# Patient Record
Sex: MALE | Race: WHITE | NOT HISPANIC OR LATINO | Employment: FULL TIME | URBAN - METROPOLITAN AREA
[De-identification: names, ages, dates, MRNs, and addresses within clinical notes are randomized per-mention and may not be internally consistent; named-entity substitution may affect disease eponyms.]

---

## 2024-05-23 DIAGNOSIS — I10 ESSENTIAL HYPERTENSION: ICD-10-CM

## 2024-05-23 DIAGNOSIS — K21.9 GASTROESOPHAGEAL REFLUX DISEASE, UNSPECIFIED WHETHER ESOPHAGITIS PRESENT: ICD-10-CM

## 2024-05-23 RX ORDER — LISINOPRIL 40 MG/1
40 TABLET ORAL DAILY
Qty: 90 TABLET | Refills: 0 | Status: SHIPPED | OUTPATIENT
Start: 2024-05-23

## 2024-05-23 RX ORDER — OMEPRAZOLE 40 MG/1
40 CAPSULE, DELAYED RELEASE ORAL
Qty: 90 CAPSULE | Refills: 2 | Status: SHIPPED | OUTPATIENT
Start: 2024-05-23

## 2024-08-07 DIAGNOSIS — I10 ESSENTIAL HYPERTENSION: ICD-10-CM

## 2024-08-08 RX ORDER — LISINOPRIL 40 MG/1
40 TABLET ORAL DAILY
Qty: 90 TABLET | Refills: 3 | Status: SHIPPED | OUTPATIENT
Start: 2024-08-08

## 2025-02-03 DIAGNOSIS — K21.9 GASTROESOPHAGEAL REFLUX DISEASE, UNSPECIFIED WHETHER ESOPHAGITIS PRESENT: ICD-10-CM

## 2025-02-03 NOTE — TELEPHONE ENCOUNTER
LV 4/19/23 no showed 7/2023, no future appts.     Only phone number active in chart is spouse Kiana cell. Left message trying to reach  pt, must make an appointment for med refills.    Dr Hodge declined refills 5/23/24

## 2025-02-05 RX ORDER — OMEPRAZOLE 40 MG/1
40 CAPSULE, DELAYED RELEASE ORAL
Qty: 90 CAPSULE | Refills: 3 | OUTPATIENT
Start: 2025-02-05

## 2025-04-02 PROBLEM — Z09 HOSPITAL DISCHARGE FOLLOW-UP: Status: ACTIVE | Noted: 2025-04-02

## 2025-04-02 PROBLEM — I10 PRIMARY HYPERTENSION: Status: ACTIVE | Noted: 2025-04-02

## 2025-04-02 PROBLEM — I63.9 CEREBROVASCULAR ACCIDENT (CVA) (MULTI): Status: ACTIVE | Noted: 2025-04-02

## 2025-04-02 PROBLEM — Z76.89 ENCOUNTER TO ESTABLISH CARE WITH NEW PROVIDER: Status: ACTIVE | Noted: 2025-04-02

## 2025-04-02 NOTE — ASSESSMENT & PLAN NOTE
MRA and CT of head unremarkable  CT of head unremarkable  No residual  Followed up with neurology on 3/31/2025  Continue plavix 75m gg daily

## 2025-04-02 NOTE — ASSESSMENT & PLAN NOTE
Was placed on  by his nifedipine 30mg by neurology  KIKO Cardoso, CNP  Continue metoprolol 25mg daily  Continue coreg 6.25mg daily

## 2025-04-02 NOTE — PROGRESS NOTES
[facility]: [facility]  PROGRESS NOTE      Rush Louis is a 59 y.o. male that is presenting today to establish care with new provider and for hospital discharge follow up.  The patient presents to the [facility] ER on 3/20/2025 with numbness of his right face. He feels like he has Novocain in his face. He states he noticed it 2 days ago. He also felt like his brain was foggy. He also states he had a difficult writing a few days ago. No loss of vision. No unilateral weakness or paresthesias. He does feel like his gait was a little off. Patient has not seen a doctor in a while when I asked if he has had his blood pressure checked. Patient admits to drinking a couple beers a day but does not feel he is dependent on it. No injuries or falls. No fevers or chills. Patient is not on any blood thinners.   He was discharged on 3/23/2025 and advised to follow up with pcp and [facility] stroke clinic.  Stroke symptoms and hypertensive urgency resolved while in the hospital.  He is a former patient of Dr. Hodge - last saw him about 1 year ago.  He is accompanied by his sister today.  He states he is doing well.,  Has no residual from the stroke.  His lisinopril was stopped by neurology and he was started on nifedipine.  He does report some right foot pain and a bump on the top of his foot.  States he kicked a heating vent when he woke up during the night 2 years ago.  Sometimes has difficulty walking    .    Assessment/Plan   Assessment & Plan  Encounter to establish care with new provider  Medications and problem list reviewed today       Hospital discharge follow-up  Hospital course reviewed with the patient as well as test results       Cerebrovascular accident (CVA), unspecified mechanism (Multi)  MRA and CT of head unremarkable  CT of head unremarkable  No residual  Followed up with neurology on 3/31/2025  Continue plavix 75m gg daily       Primary hypertension  Was placed on  by his nifedipine 30mg  by neurology  Destini Neves, APRN, CNP  Continue metoprolol 25mg daily  Continue coreg 6.25mg daily       Gastroesophageal reflux disease without esophagitis  Stable no recent flares  Continue omeprazole 40mg daily       Hyperlipidemia, unspecified hyperlipidemia type  Continue atorvastatin 40mg daily  Orders:    Lipid Panel; Future    Gastroesophageal reflux disease, unspecified whether esophagitis present    Orders:    omeprazole (PriLOSEC) 40 mg DR capsule; Take 1 capsule (40 mg) by mouth once daily in the morning. Take before meals.    Smoker  Smokes < 1/2 ppd  Slowly decreasing    Foot pain, right    Orders:    XR foot right 1-2 views; Future      Subjective   HPI  Review of Systems   Constitutional: Negative.    Respiratory: Negative.     Cardiovascular: Negative.    Gastrointestinal: Negative.    Skin: Negative.    Psychiatric/Behavioral: Negative.        Objective   There were no vitals filed for this visit.   There is no height or weight on file to calculate BMI.  Physical Exam  Cardiovascular:      Rate and Rhythm: Normal rate and regular rhythm.      Heart sounds: Normal heart sounds.   Pulmonary:      Effort: Pulmonary effort is normal.      Breath sounds: Normal breath sounds.   Abdominal:      General: Bowel sounds are normal.      Palpations: Abdomen is soft.   Musculoskeletal:      Comments: See hpi   Skin:     General: Skin is warm and dry.   Neurological:      General: No focal deficit present.      Mental Status: He is oriented to person, place, and time.   Psychiatric:         Mood and Affect: Mood normal.         Thought Content: Thought content normal.         Judgment: Judgment normal.       Diagnostic Results   Lab Results   Component Value Date    GLUCOSE 112 (H) 04/19/2023    CALCIUM 9.1 04/19/2023     04/19/2023    K 4.0 04/19/2023    CO2 23 (L) 04/19/2023     04/19/2023    BUN 5 (L) 04/19/2023    CREATININE 0.8 04/19/2023     Lab Results   Component Value Date    ALT 21  "04/19/2023    AST 27 04/19/2023    ALKPHOS 94 04/19/2023    BILITOT 0.5 04/19/2023     Lab Results   Component Value Date    WBC 9.2 04/19/2023    HGB 14.0 04/19/2023    HCT 41.1 04/19/2023    MCV 98.8 04/19/2023     04/19/2023     Lab Results   Component Value Date    CHOL 183 04/19/2023    CHOL 238 (H) 04/26/2021    CHOL 243 (H) 03/10/2020     Lab Results   Component Value Date    HDL 55 04/19/2023    HDL 47 04/26/2021    HDL 42 03/10/2020     Lab Results   Component Value Date    LDLCALC 89 04/19/2023    LDLCALC  04/26/2021     Unable to report calculated LDL due to increased triglycerides. Direct    LDLCALC  03/10/2020     Unable to report calculated LDL due to increased triglycerides. Direct     Lab Results   Component Value Date    TRIG 197 (H) 04/19/2023    TRIG 559 (H) 04/26/2021    TRIG 568 (H) 03/10/2020     No components found for: \"CHOLHDL\"  Lab Results   Component Value Date    HGBA1C 4.5 03/22/2025     Other labs not included in the list above were reviewed either before or during this encounter.    History    No past medical history on file.  No past surgical history on file.  No family history on file.  Social History     Socioeconomic History    Marital status:      Spouse name: Not on file    Number of children: Not on file    Years of education: Not on file    Highest education level: Not on file   Occupational History    Not on file   Tobacco Use    Smoking status: Not on file    Smokeless tobacco: Not on file   Substance and Sexual Activity    Alcohol use: Not on file    Drug use: Not on file    Sexual activity: Not on file   Other Topics Concern    Not on file   Social History Narrative    Not on file     Social Drivers of Health     Financial Resource Strain: Not on file   Food Insecurity: Not on file   Transportation Needs: Not on file   Physical Activity: Not on file   Stress: Not on file   Social Connections: Not on file   Intimate Partner Violence: Not on file   Housing " Stability: Not on file     Not on File  Current Outpatient Medications on File Prior to Visit   Medication Sig Dispense Refill    carvedilol (Coreg) 6.25 mg tablet TAKE 1 TABLET TWICE A DAY WITH FOOD 60 tablet 0    lisinopril 40 mg tablet TAKE 1 TABLET DAILY 90 tablet 3    omeprazole (PriLOSEC) 40 mg DR capsule Take 1 capsule (40 mg) by mouth once daily in the morning. Take before meals. 90 capsule 2     No current facility-administered medications on file prior to visit.     Immunization History   Administered Date(s) Administered    Pfizer Purple Cap SARS-CoV-2 01/13/2022     Patient's medical history was reviewed and updated either before or during this encounter.       Concepción Fiore, APRN-CNP

## 2025-04-03 ENCOUNTER — OFFICE VISIT (OUTPATIENT)
Dept: PRIMARY CARE | Facility: CLINIC | Age: 60
End: 2025-04-03
Payer: COMMERCIAL

## 2025-04-03 ENCOUNTER — HOSPITAL ENCOUNTER (OUTPATIENT)
Dept: RADIOLOGY | Facility: CLINIC | Age: 60
Discharge: HOME | End: 2025-04-03
Payer: COMMERCIAL

## 2025-04-03 VITALS
TEMPERATURE: 97 F | WEIGHT: 180 LBS | BODY MASS INDEX: 24.38 KG/M2 | SYSTOLIC BLOOD PRESSURE: 160 MMHG | OXYGEN SATURATION: 99 % | HEART RATE: 85 BPM | DIASTOLIC BLOOD PRESSURE: 88 MMHG | HEIGHT: 72 IN

## 2025-04-03 DIAGNOSIS — F17.200 SMOKER: ICD-10-CM

## 2025-04-03 DIAGNOSIS — I63.9 CEREBROVASCULAR ACCIDENT (CVA), UNSPECIFIED MECHANISM (MULTI): ICD-10-CM

## 2025-04-03 DIAGNOSIS — K21.9 GASTROESOPHAGEAL REFLUX DISEASE, UNSPECIFIED WHETHER ESOPHAGITIS PRESENT: ICD-10-CM

## 2025-04-03 DIAGNOSIS — K21.9 GASTROESOPHAGEAL REFLUX DISEASE WITHOUT ESOPHAGITIS: ICD-10-CM

## 2025-04-03 DIAGNOSIS — Z76.89 ENCOUNTER TO ESTABLISH CARE WITH NEW PROVIDER: Primary | ICD-10-CM

## 2025-04-03 DIAGNOSIS — E78.5 HYPERLIPIDEMIA, UNSPECIFIED HYPERLIPIDEMIA TYPE: ICD-10-CM

## 2025-04-03 DIAGNOSIS — M79.671 FOOT PAIN, RIGHT: ICD-10-CM

## 2025-04-03 DIAGNOSIS — Z09 HOSPITAL DISCHARGE FOLLOW-UP: ICD-10-CM

## 2025-04-03 DIAGNOSIS — I10 PRIMARY HYPERTENSION: ICD-10-CM

## 2025-04-03 PROCEDURE — 73630 X-RAY EXAM OF FOOT: CPT | Mod: RT

## 2025-04-03 PROCEDURE — 3079F DIAST BP 80-89 MM HG: CPT | Performed by: NURSE PRACTITIONER

## 2025-04-03 PROCEDURE — 3008F BODY MASS INDEX DOCD: CPT | Performed by: NURSE PRACTITIONER

## 2025-04-03 PROCEDURE — 99495 TRANSJ CARE MGMT MOD F2F 14D: CPT | Performed by: NURSE PRACTITIONER

## 2025-04-03 PROCEDURE — 3077F SYST BP >= 140 MM HG: CPT | Performed by: NURSE PRACTITIONER

## 2025-04-03 PROCEDURE — 99214 OFFICE O/P EST MOD 30 MIN: CPT | Performed by: NURSE PRACTITIONER

## 2025-04-03 PROCEDURE — 4004F PT TOBACCO SCREEN RCVD TLK: CPT | Performed by: NURSE PRACTITIONER

## 2025-04-03 PROCEDURE — 73630 X-RAY EXAM OF FOOT: CPT | Mod: RIGHT SIDE | Performed by: RADIOLOGY

## 2025-04-03 RX ORDER — METOPROLOL TARTRATE 25 MG/1
25 TABLET, FILM COATED ORAL
COMMUNITY

## 2025-04-03 RX ORDER — ATORVASTATIN CALCIUM 40 MG/1
40 TABLET, FILM COATED ORAL DAILY
COMMUNITY

## 2025-04-03 RX ORDER — NIFEDIPINE 30 MG/1
30 TABLET, FILM COATED, EXTENDED RELEASE ORAL
COMMUNITY

## 2025-04-03 RX ORDER — NITROGLYCERIN 0.6 MG/1
0.6 TABLET SUBLINGUAL EVERY 5 MIN PRN
COMMUNITY

## 2025-04-03 RX ORDER — ASPIRIN 81 MG/1
81 TABLET ORAL DAILY
COMMUNITY

## 2025-04-03 RX ORDER — THIAMINE HCL 50 MG
50 TABLET ORAL DAILY
COMMUNITY

## 2025-04-03 RX ORDER — CLOPIDOGREL BISULFATE 75 MG/1
TABLET ORAL DAILY
COMMUNITY

## 2025-04-03 RX ORDER — OMEPRAZOLE 40 MG/1
40 CAPSULE, DELAYED RELEASE ORAL
Qty: 90 CAPSULE | Refills: 2 | Status: SHIPPED | OUTPATIENT
Start: 2025-04-03

## 2025-04-03 ASSESSMENT — ENCOUNTER SYMPTOMS
CARDIOVASCULAR NEGATIVE: 1
RESPIRATORY NEGATIVE: 1
PSYCHIATRIC NEGATIVE: 1
CONSTITUTIONAL NEGATIVE: 1
GASTROINTESTINAL NEGATIVE: 1

## 2025-04-03 ASSESSMENT — PAIN SCALES - GENERAL: PAINLEVEL_OUTOF10: 0-NO PAIN

## 2025-04-03 ASSESSMENT — PATIENT HEALTH QUESTIONNAIRE - PHQ9
2. FEELING DOWN, DEPRESSED OR HOPELESS: NOT AT ALL
1. LITTLE INTEREST OR PLEASURE IN DOING THINGS: NOT AT ALL
SUM OF ALL RESPONSES TO PHQ9 QUESTIONS 1 AND 2: 0

## 2025-04-04 ENCOUNTER — TELEPHONE (OUTPATIENT)
Dept: PRIMARY CARE | Facility: CLINIC | Age: 60
End: 2025-04-04
Payer: COMMERCIAL

## 2025-04-04 NOTE — TELEPHONE ENCOUNTER
Per Concepción, pt was notified via VM - he will stop taking omeprazole and start taking pantoprazole due to him being on Plavix. Left contact number to office, in case of any questions

## 2025-04-07 DIAGNOSIS — E78.5 HYPERLIPIDEMIA, UNSPECIFIED HYPERLIPIDEMIA TYPE: Primary | ICD-10-CM

## 2025-04-07 DIAGNOSIS — I10 ESSENTIAL HYPERTENSION: ICD-10-CM

## 2025-04-07 DIAGNOSIS — I48.91 ATRIAL FIBRILLATION, UNSPECIFIED TYPE (MULTI): ICD-10-CM

## 2025-04-07 RX ORDER — ATORVASTATIN CALCIUM 40 MG/1
40 TABLET, FILM COATED ORAL DAILY
Qty: 90 TABLET | Refills: 3 | Status: SHIPPED | OUTPATIENT
Start: 2025-04-07

## 2025-04-07 RX ORDER — CARVEDILOL 6.25 MG/1
6.25 TABLET ORAL
Qty: 180 TABLET | Refills: 3 | Status: SHIPPED | OUTPATIENT
Start: 2025-04-07

## 2025-04-07 RX ORDER — METOPROLOL TARTRATE 25 MG/1
25 TABLET, FILM COATED ORAL DAILY
Qty: 90 TABLET | Refills: 3 | Status: SHIPPED | OUTPATIENT
Start: 2025-04-07

## 2025-04-07 RX ORDER — NIFEDIPINE 30 MG/1
30 TABLET, FILM COATED, EXTENDED RELEASE ORAL
Qty: 90 TABLET | Refills: 3 | Status: SHIPPED | OUTPATIENT
Start: 2025-04-07

## 2025-04-07 RX ORDER — CLOPIDOGREL BISULFATE 75 MG/1
75 TABLET ORAL DAILY
Qty: 90 TABLET | Refills: 3 | Status: SHIPPED | OUTPATIENT
Start: 2025-04-07

## 2025-04-15 ENCOUNTER — TELEPHONE (OUTPATIENT)
Dept: PRIMARY CARE | Facility: CLINIC | Age: 60
End: 2025-04-15
Payer: COMMERCIAL

## 2025-04-15 DIAGNOSIS — I10 PRIMARY HYPERTENSION: Primary | ICD-10-CM

## 2025-04-15 RX ORDER — METOPROLOL TARTRATE 25 MG/1
25 TABLET, FILM COATED ORAL 2 TIMES DAILY
Qty: 60 TABLET | Refills: 5 | Status: SHIPPED | OUTPATIENT
Start: 2025-04-15 | End: 2025-10-12

## 2025-04-15 NOTE — TELEPHONE ENCOUNTER
Pt states his BP has improved but he has leveled off at 160-170 over . Wondered if you want to change anything?    If new rx, send to [facility] please.

## 2025-04-15 NOTE — TELEPHONE ENCOUNTER
Let the patient know I want him to take his metoprolol twice a day I sent a [facility] for [facility]  Continue to monitor bp and if it does not go down to call the office.  Avoid high salty foods, processed, canned and fast foods

## 2025-05-14 PROBLEM — Z12.5 SCREENING FOR PROSTATE CANCER: Status: ACTIVE | Noted: 2025-04-02

## 2025-05-14 PROBLEM — Z09 HOSPITAL DISCHARGE FOLLOW-UP: Status: RESOLVED | Noted: 2025-04-02 | Resolved: 2025-05-14

## 2025-05-14 NOTE — ASSESSMENT & PLAN NOTE
"Continue metoprolol 25mg bid  Continue nifedipine ER 30mg daily  States bp at home runs 140-80's - if bp continues to rise above 150/80\"s he is to call the office    Orders:    Comprehensive Metabolic Panel; Future    "

## 2025-05-14 NOTE — ASSESSMENT & PLAN NOTE
"Continue aspirin 81mg daily  Continue plavix 75mg daily    Ct brain 3/23/2025   no acute process but    MRI -\" Acute to subacute infarcts in the left thalamus and left paramedian frontal white matter.    Advised to follow up with neurology - saw Randi HOOVER CNP on 3/31/2025 w/ [facility]    Offered speech therapy today but patient declined       "

## 2025-05-14 NOTE — PROGRESS NOTES
"  .[facility]: [facility]  PHYSICAL EXAM      Rush Louis is a 59 y.o. male that is presenting today for annual physical exam and management of medical conditions.  3/20/25 in [facility] for hypertensive crisis and facial numbness - cva- admitted 3/20-3/23/2025.  States his fine motor and speech is affected when he is tired.  No headaches, dizziness, lightheadedness, no numbness or tingling.  No headaches, vision or hearing changes..  Reports he is still having some right foot pain rashawn on the top of his foot.  States he has difficulty walking at times.  Had x ray at last visit    Assessment/Plan   Assessment & Plan  Annual physical exam  Medications and problem list reconciled today    Needs colorectal screening - states [facility]    Primary hypertension  Continue metoprolol 25mg bid  Continue nifedipine ER 30mg daily  States bp at home runs 140-80's - if bp continues to rise above 150/80\"s he is to call the office    Orders:    Comprehensive Metabolic Panel; Future    Hyperlipidemia, unspecified hyperlipidemia type  Continue atorvastatin 40mg daily  Orders:    Lipid Panel; Future    Gastroesophageal reflux disease, unspecified whether esophagitis present  Continue Prilosec 40mg daily  Orders:    CBC; Future    Cerebrovascular accident (CVA), unspecified mechanism (Multi)  Continue aspirin 81mg daily  Continue plavix 75mg daily    Ct brain 3/23/2025   no acute process but    MRI -\" Acute to subacute infarcts in the left thalamus and left paramedian frontal white matter.    Advised to follow up with neurology - saw Randi HOOVER CNP on 3/31/2025 w/ [facility]    Offered speech therapy today but patient declined       Screening for prostate cancer  Psa ordered       Smoker  Smokes < 1/2 ppd  Orders:    PSA; Future    B12 deficiency    Orders:    thiamine (Vitamin B-1) 50 mg tablet; Take 1 tablet (50 mg) by mouth once daily.    Vitamin B12; Future    Encounter " for immunization  DTaP done in clinic today       Foot pain, right  Reviewed x ray again with the patient    referral to podiatry Dr. Rodriguez  Foot x ray 4/4/2025  IMPRESSION:  Degenerative changes, most severely affecting the 1st tarsometatarsal joint.       Constipation, unspecified constipation type  Notes constipation with medication changes recently. No abd pain, tenderness or blood in stools. - Encouraged fiber intake and to drink more water.         Subjective   HPI  Review of Systems   Constitutional: Negative.    HENT: Negative.  Negative for trouble swallowing.    Eyes: Negative.    Respiratory: Negative.  Negative for cough and wheezing.    Cardiovascular: Negative.    Gastrointestinal:  Positive for constipation. Negative for abdominal pain and blood in stool.        Able to have a BM, notes feels more constipated post hospitalization in March   Endocrine: Negative.    Genitourinary: Negative.    Musculoskeletal:         Rt foot pain ongoing   Skin: Negative.    Neurological:  Positive for speech difficulty. Negative for dizziness, weakness, light-headedness, numbness and headaches.        Pt notes garbled speech intermittently, at the end of the day when he is tired denies any aphagia   Hematological: Negative.    Psychiatric/Behavioral: Negative.        Objective   Vitals:    05/15/25 1504   BP: 148/90   Pulse: 87   Temp: 36.1 °C (97 °F)   SpO2: 98%       Body mass index is 24.68 kg/m².  Physical Exam  Vitals reviewed.   Constitutional:       Appearance: Normal appearance.   HENT:      Head: Normocephalic and atraumatic.      Right Ear: Tympanic membrane normal.      Left Ear: Tympanic membrane normal.      Mouth/Throat:      Mouth: Mucous membranes are moist.   Eyes:      Extraocular Movements: Extraocular movements intact.      Pupils: Pupils are equal, round, and reactive to light.   Cardiovascular:      Rate and Rhythm: Normal rate and regular rhythm.      Pulses: Normal pulses.      Heart sounds:  Normal heart sounds.   Pulmonary:      Effort: Pulmonary effort is normal.      Comments: Diminished breath sounds throughout  Abdominal:      General: Bowel sounds are normal.      Palpations: Abdomen is soft. There is no mass.      Tenderness: There is no abdominal tenderness. There is no guarding.   Musculoskeletal:         General: Normal range of motion.      Cervical back: Normal range of motion.   Skin:     General: Skin is warm.      Capillary Refill: Capillary refill takes less than 2 seconds.   Neurological:      General: No focal deficit present.      Mental Status: He is alert and oriented to person, place, and time.   Psychiatric:         Mood and Affect: Mood normal.         Behavior: Behavior normal.         Thought Content: Thought content normal.         Judgment: Judgment normal.       Diagnostic Results   Lab Results   Component Value Date    GLUCOSE 112 (H) 04/19/2023    CALCIUM 9.1 04/19/2023     04/19/2023    K 4.0 04/19/2023    CO2 23 (L) 04/19/2023     04/19/2023    BUN 5 (L) 04/19/2023    CREATININE 0.8 04/19/2023     Lab Results   Component Value Date    ALT 21 04/19/2023    AST 27 04/19/2023    ALKPHOS 94 04/19/2023    BILITOT 0.5 04/19/2023     Lab Results   Component Value Date    WBC 9.2 04/19/2023    HGB 14.0 04/19/2023    HCT 41.1 04/19/2023    MCV 98.8 04/19/2023     04/19/2023     Lab Results   Component Value Date    CHOL 183 04/19/2023    CHOL 238 (H) 04/26/2021    CHOL 243 (H) 03/10/2020     Lab Results   Component Value Date    HDL 55 04/19/2023    HDL 47 04/26/2021    HDL 42 03/10/2020     Lab Results   Component Value Date    LDLCALC 89 04/19/2023    LDLCALC  04/26/2021     Unable to report calculated LDL due to increased triglycerides. Direct    LDLCALC  03/10/2020     Unable to report calculated LDL due to increased triglycerides. Direct     Lab Results   Component Value Date    TRIG 197 (H) 04/19/2023    TRIG 559 (H) 04/26/2021    TRIG 568 (H) 03/10/2020  "    No components found for: \"CHOLHDL\"  Lab Results   Component Value Date    HGBA1C 4.5 03/22/2025     Other labs not included in the list above were reviewed either before or during this encounter.    History   Medical History[1]  Surgical History[2]  Family History[3]  Social History     Socioeconomic History    Marital status:      Spouse name: Not on file    Number of children: Not on file    Years of education: Not on file    Highest education level: Not on file   Occupational History    Not on file   Tobacco Use    Smoking status: Every Day     Current packs/day: 0.50     Average packs/day: 0.5 packs/day for 40.4 years (20.2 ttl pk-yrs)     Types: Cigarettes     Start date: 1985    Smokeless tobacco: Never   Vaping Use    Vaping status: Never Used   Substance and Sexual Activity    Alcohol use: Yes     Alcohol/week: 2.0 standard drinks of alcohol     Types: 2 Cans of beer per week    Drug use: Never    Sexual activity: Not on file   Other Topics Concern    Not on file   Social History Narrative    Not on file     Social Drivers of Health     Financial Resource Strain: Not on file   Food Insecurity: Not on file   Transportation Needs: Not on file   Physical Activity: Not on file   Stress: Not on file   Social Connections: Not on file   Intimate Partner Violence: Not on file   Housing Stability: Not on file     Allergies[4]  Medications Ordered Prior to Encounter[5]  Immunization History   Administered Date(s) Administered    COVID-19, mRNA, LNP-S, PF, 30 mcg/0.3 mL dose 03/17/2021, 04/07/2021    DT (pediatric) 02/13/2003    Influenza, Unspecified 10/17/2007    Influenza, injectable, quadrivalent 11/13/2018    Influenza, seasonal, injectable 11/10/2010, 10/12/2011    Pfizer Purple Cap SARS-CoV-2 01/13/2022    Tdap vaccine, age 7 year and older (BOOSTRIX, ADACEL) 05/11/2011     Patient's medical history was reviewed and updated either before or during this encounter.       Concepción Fiore, APRN-CNP     "     [1]   Past Medical History:  Diagnosis Date    Stroke (Multi) 03/20/2025   [2] History reviewed. No pertinent surgical history.  [3] No family history on file.  [4]   Allergies  Allergen Reactions    Atorvastatin Myalgia   [5]   Current Outpatient Medications on File Prior to Visit   Medication Sig Dispense Refill    aspirin 81 mg EC tablet Take 1 tablet (81 mg) by mouth once daily.      atorvastatin (Lipitor) 40 mg tablet Take 1 tablet (40 mg) by mouth once daily. 90 tablet 3    carvedilol (Coreg) 6.25 mg tablet Take 1 tablet (6.25 mg) by mouth 2 times daily (morning and late afternoon). 180 tablet 3    clopidogrel (Plavix) 75 mg tablet Take 1 tablet (75 mg) by mouth once daily. 90 tablet 3    metoprolol tartrate (Lopressor) 25 mg tablet Take 1 tablet (25 mg) by mouth 2 times a day. 60 tablet 5    NIFEdipine ER (Adalat CC) 30 mg 24 hr tablet Take 1 tablet (30 mg) by mouth once daily in the morning. Take before meals. Do not crush, chew, or split. 90 tablet 3    nitroglycerin (Nitrostat) 0.6 mg SL tablet Place 1 tablet (0.6 mg) under the tongue every 5 minutes if needed for chest pain.      omeprazole (PriLOSEC) 40 mg DR capsule Take 1 capsule (40 mg) by mouth once daily in the morning. Take before meals. 90 capsule 2    [DISCONTINUED] thiamine (Vitamin B-1) 50 mg tablet Take 1 tablet (50 mg) by mouth once daily.       No current facility-administered medications on file prior to visit.

## 2025-05-15 ENCOUNTER — OFFICE VISIT (OUTPATIENT)
Dept: PRIMARY CARE | Facility: CLINIC | Age: 60
End: 2025-05-15
Payer: COMMERCIAL

## 2025-05-15 VITALS
SYSTOLIC BLOOD PRESSURE: 148 MMHG | OXYGEN SATURATION: 98 % | DIASTOLIC BLOOD PRESSURE: 90 MMHG | WEIGHT: 182 LBS | BODY MASS INDEX: 24.65 KG/M2 | HEART RATE: 87 BPM | TEMPERATURE: 97 F | HEIGHT: 72 IN

## 2025-05-15 DIAGNOSIS — F17.200 SMOKER: ICD-10-CM

## 2025-05-15 DIAGNOSIS — K21.9 GASTROESOPHAGEAL REFLUX DISEASE, UNSPECIFIED WHETHER ESOPHAGITIS PRESENT: ICD-10-CM

## 2025-05-15 DIAGNOSIS — E78.5 HYPERLIPIDEMIA, UNSPECIFIED HYPERLIPIDEMIA TYPE: ICD-10-CM

## 2025-05-15 DIAGNOSIS — Z23 ENCOUNTER FOR IMMUNIZATION: ICD-10-CM

## 2025-05-15 DIAGNOSIS — Z00.00 ANNUAL PHYSICAL EXAM: Primary | ICD-10-CM

## 2025-05-15 DIAGNOSIS — Z12.5 SCREENING FOR PROSTATE CANCER: ICD-10-CM

## 2025-05-15 DIAGNOSIS — I10 PRIMARY HYPERTENSION: ICD-10-CM

## 2025-05-15 DIAGNOSIS — E53.8 B12 DEFICIENCY: ICD-10-CM

## 2025-05-15 DIAGNOSIS — M79.671 FOOT PAIN, RIGHT: ICD-10-CM

## 2025-05-15 DIAGNOSIS — I63.9 CEREBROVASCULAR ACCIDENT (CVA), UNSPECIFIED MECHANISM (MULTI): ICD-10-CM

## 2025-05-15 DIAGNOSIS — K59.00 CONSTIPATION, UNSPECIFIED CONSTIPATION TYPE: ICD-10-CM

## 2025-05-15 PROCEDURE — 3080F DIAST BP >= 90 MM HG: CPT | Performed by: NURSE PRACTITIONER

## 2025-05-15 PROCEDURE — 3008F BODY MASS INDEX DOCD: CPT | Performed by: NURSE PRACTITIONER

## 2025-05-15 PROCEDURE — 3077F SYST BP >= 140 MM HG: CPT | Performed by: NURSE PRACTITIONER

## 2025-05-15 PROCEDURE — 90471 IMMUNIZATION ADMIN: CPT | Performed by: NURSE PRACTITIONER

## 2025-05-15 PROCEDURE — 99396 PREV VISIT EST AGE 40-64: CPT | Performed by: NURSE PRACTITIONER

## 2025-05-15 PROCEDURE — 4004F PT TOBACCO SCREEN RCVD TLK: CPT | Performed by: NURSE PRACTITIONER

## 2025-05-15 PROCEDURE — 90715 TDAP VACCINE 7 YRS/> IM: CPT | Performed by: NURSE PRACTITIONER

## 2025-05-15 RX ORDER — THIAMINE HCL 50 MG
50 TABLET ORAL DAILY
Qty: 90 TABLET | Refills: 1 | Status: SHIPPED | OUTPATIENT
Start: 2025-05-15

## 2025-05-15 ASSESSMENT — ENCOUNTER SYMPTOMS
CARDIOVASCULAR NEGATIVE: 1
HEMATOLOGIC/LYMPHATIC NEGATIVE: 1
HEADACHES: 0
ABDOMINAL PAIN: 0
NUMBNESS: 0
BLOOD IN STOOL: 0
PSYCHIATRIC NEGATIVE: 1
TROUBLE SWALLOWING: 0
WHEEZING: 0
ENDOCRINE NEGATIVE: 1
EYES NEGATIVE: 1
LIGHT-HEADEDNESS: 0
WEAKNESS: 0
SPEECH DIFFICULTY: 1
CONSTITUTIONAL NEGATIVE: 1
COUGH: 0
CONSTIPATION: 1
DIZZINESS: 0
RESPIRATORY NEGATIVE: 1

## 2025-05-15 ASSESSMENT — PATIENT HEALTH QUESTIONNAIRE - PHQ9
1. LITTLE INTEREST OR PLEASURE IN DOING THINGS: NOT AT ALL
2. FEELING DOWN, DEPRESSED OR HOPELESS: NOT AT ALL
SUM OF ALL RESPONSES TO PHQ9 QUESTIONS 1 AND 2: 0

## 2025-05-15 ASSESSMENT — PAIN SCALES - GENERAL: PAINLEVEL_OUTOF10: 0-NO PAIN

## 2025-05-15 NOTE — ASSESSMENT & PLAN NOTE
Reviewed x ray again with the patient    referral to podiatry Dr. Rodriguez  Foot x ray 4/4/2025  IMPRESSION:  Degenerative changes, most severely affecting the 1st tarsometatarsal joint.

## 2025-05-15 NOTE — ASSESSMENT & PLAN NOTE
Notes constipation with medication changes recently. No abd pain, tenderness or blood in stools. - Encouraged fiber intake and to drink more water.

## 2025-05-15 NOTE — ASSESSMENT & PLAN NOTE
Orders:    thiamine (Vitamin B-1) 50 mg tablet; Take 1 tablet (50 mg) by mouth once daily.    Vitamin B12; Future